# Patient Record
Sex: MALE | Race: WHITE | URBAN - NONMETROPOLITAN AREA
[De-identification: names, ages, dates, MRNs, and addresses within clinical notes are randomized per-mention and may not be internally consistent; named-entity substitution may affect disease eponyms.]

---

## 2018-11-25 ENCOUNTER — APPOINTMENT (OUTPATIENT)
Dept: CT IMAGING | Facility: OTHER | Age: 58
End: 2018-11-25
Attending: EMERGENCY MEDICINE
Payer: COMMERCIAL

## 2018-11-25 ENCOUNTER — HOSPITAL ENCOUNTER (EMERGENCY)
Facility: OTHER | Age: 58
Discharge: HOME | End: 2018-11-25
Attending: EMERGENCY MEDICINE | Admitting: EMERGENCY MEDICINE
Payer: COMMERCIAL

## 2018-11-25 VITALS
HEIGHT: 66 IN | TEMPERATURE: 98.1 F | RESPIRATION RATE: 18 BRPM | SYSTOLIC BLOOD PRESSURE: 119 MMHG | OXYGEN SATURATION: 99 % | DIASTOLIC BLOOD PRESSURE: 71 MMHG

## 2018-11-25 DIAGNOSIS — N20.0 NEPHROLITHIASIS: ICD-10-CM

## 2018-11-25 LAB
ALBUMIN SERPL-MCNC: 4.1 G/DL (ref 3.5–5.7)
ALBUMIN UR-MCNC: 30 MG/DL
ALP SERPL-CCNC: 50 U/L (ref 34–104)
ALT SERPL W P-5'-P-CCNC: 16 U/L (ref 7–52)
ANION GAP SERPL CALCULATED.3IONS-SCNC: 7 MMOL/L (ref 3–14)
APPEARANCE UR: ABNORMAL
AST SERPL W P-5'-P-CCNC: 18 U/L (ref 13–39)
BACTERIA #/AREA URNS HPF: ABNORMAL /HPF
BASOPHILS # BLD AUTO: 0 10E9/L (ref 0–0.2)
BASOPHILS NFR BLD AUTO: 0.3 %
BILIRUB SERPL-MCNC: 0.5 MG/DL (ref 0.3–1)
BILIRUB UR QL STRIP: ABNORMAL
BUN SERPL-MCNC: 16 MG/DL (ref 7–25)
CALCIUM SERPL-MCNC: 9.1 MG/DL (ref 8.6–10.3)
CHLORIDE SERPL-SCNC: 107 MMOL/L (ref 98–107)
CO2 SERPL-SCNC: 27 MMOL/L (ref 21–31)
COLOR UR AUTO: YELLOW
CREAT SERPL-MCNC: 0.99 MG/DL (ref 0.7–1.3)
DIFFERENTIAL METHOD BLD: ABNORMAL
EOSINOPHIL # BLD AUTO: 0.1 10E9/L (ref 0–0.7)
EOSINOPHIL NFR BLD AUTO: 1.3 %
ERYTHROCYTE [DISTWIDTH] IN BLOOD BY AUTOMATED COUNT: 12.6 % (ref 10–15)
GFR SERPL CREATININE-BSD FRML MDRD: 78 ML/MIN/1.7M2
GLUCOSE SERPL-MCNC: 104 MG/DL (ref 70–105)
GLUCOSE UR STRIP-MCNC: NEGATIVE MG/DL
HCT VFR BLD AUTO: 39.8 % (ref 40–53)
HGB BLD-MCNC: 13.5 G/DL (ref 13.3–17.7)
HGB UR QL STRIP: ABNORMAL
IMM GRANULOCYTES # BLD: 0 10E9/L (ref 0–0.4)
IMM GRANULOCYTES NFR BLD: 0.3 %
KETONES UR STRIP-MCNC: NEGATIVE MG/DL
LEUKOCYTE ESTERASE UR QL STRIP: NEGATIVE
LYMPHOCYTES # BLD AUTO: 0.7 10E9/L (ref 0.8–5.3)
LYMPHOCYTES NFR BLD AUTO: 7.5 %
MCH RBC QN AUTO: 31.3 PG (ref 26.5–33)
MCHC RBC AUTO-ENTMCNC: 33.9 G/DL (ref 31.5–36.5)
MCV RBC AUTO: 92 FL (ref 78–100)
MONOCYTES # BLD AUTO: 0.5 10E9/L (ref 0–1.3)
MONOCYTES NFR BLD AUTO: 5.4 %
MUCOUS THREADS #/AREA URNS LPF: PRESENT /LPF
NEUTROPHILS # BLD AUTO: 8.1 10E9/L (ref 1.6–8.3)
NEUTROPHILS NFR BLD AUTO: 85.2 %
NITRATE UR QL: NEGATIVE
PH UR STRIP: 5.5 PH (ref 5–9)
PLATELET # BLD AUTO: 192 10E9/L (ref 150–450)
POTASSIUM SERPL-SCNC: 4.3 MMOL/L (ref 3.5–5.1)
PROT SERPL-MCNC: 6.4 G/DL (ref 6.4–8.9)
RBC # BLD AUTO: 4.32 10E12/L (ref 4.4–5.9)
RBC #/AREA URNS AUTO: ABNORMAL /HPF
SODIUM SERPL-SCNC: 141 MMOL/L (ref 134–144)
SOURCE: ABNORMAL
SP GR UR STRIP: >1.03 (ref 1–1.03)
UROBILINOGEN UR STRIP-ACNC: 0.2 EU/DL (ref 0.2–1)
WBC # BLD AUTO: 9.5 10E9/L (ref 4–11)
WBC #/AREA URNS AUTO: ABNORMAL /HPF

## 2018-11-25 PROCEDURE — 74176 CT ABD & PELVIS W/O CONTRAST: CPT | Mod: TC

## 2018-11-25 PROCEDURE — 36415 COLL VENOUS BLD VENIPUNCTURE: CPT | Performed by: FAMILY MEDICINE

## 2018-11-25 PROCEDURE — 25000132 ZZH RX MED GY IP 250 OP 250 PS 637: Performed by: FAMILY MEDICINE

## 2018-11-25 PROCEDURE — 96360 HYDRATION IV INFUSION INIT: CPT | Performed by: EMERGENCY MEDICINE

## 2018-11-25 PROCEDURE — 96372 THER/PROPH/DIAG INJ SC/IM: CPT | Mod: XU | Performed by: EMERGENCY MEDICINE

## 2018-11-25 PROCEDURE — 99284 EMERGENCY DEPT VISIT MOD MDM: CPT | Mod: Z6 | Performed by: EMERGENCY MEDICINE

## 2018-11-25 PROCEDURE — 80053 COMPREHEN METABOLIC PANEL: CPT | Performed by: FAMILY MEDICINE

## 2018-11-25 PROCEDURE — 25000128 H RX IP 250 OP 636: Performed by: EMERGENCY MEDICINE

## 2018-11-25 PROCEDURE — 25000131 ZZH RX MED GY IP 250 OP 636 PS 637: Performed by: EMERGENCY MEDICINE

## 2018-11-25 PROCEDURE — 25000128 H RX IP 250 OP 636: Performed by: FAMILY MEDICINE

## 2018-11-25 PROCEDURE — 85025 COMPLETE CBC W/AUTO DIFF WBC: CPT | Performed by: FAMILY MEDICINE

## 2018-11-25 PROCEDURE — 99284 EMERGENCY DEPT VISIT MOD MDM: CPT | Mod: 25 | Performed by: EMERGENCY MEDICINE

## 2018-11-25 PROCEDURE — 81001 URINALYSIS AUTO W/SCOPE: CPT | Performed by: EMERGENCY MEDICINE

## 2018-11-25 RX ORDER — TAMSULOSIN HYDROCHLORIDE 0.4 MG/1
0.4 CAPSULE ORAL ONCE
Status: COMPLETED | OUTPATIENT
Start: 2018-11-25 | End: 2018-11-25

## 2018-11-25 RX ORDER — KETOROLAC TROMETHAMINE 30 MG/ML
30 INJECTION, SOLUTION INTRAMUSCULAR; INTRAVENOUS ONCE
Status: COMPLETED | OUTPATIENT
Start: 2018-11-25 | End: 2018-11-25

## 2018-11-25 RX ORDER — TAMSULOSIN HYDROCHLORIDE 0.4 MG/1
0.4 CAPSULE ORAL DAILY
Qty: 10 CAPSULE | Refills: 0 | Status: SHIPPED | OUTPATIENT
Start: 2018-11-25 | End: 2018-12-05

## 2018-11-25 RX ORDER — ONDANSETRON 4 MG/1
4 TABLET, ORALLY DISINTEGRATING ORAL ONCE
Status: COMPLETED | OUTPATIENT
Start: 2018-11-25 | End: 2018-11-25

## 2018-11-25 RX ORDER — ONDANSETRON 4 MG/1
4 TABLET, ORALLY DISINTEGRATING ORAL EVERY 6 HOURS PRN
Qty: 10 TABLET | Refills: 0 | Status: SHIPPED | OUTPATIENT
Start: 2018-11-25 | End: 2018-12-05

## 2018-11-25 RX ADMIN — SODIUM CHLORIDE 1000 ML: 900 INJECTION, SOLUTION INTRAVENOUS at 08:09

## 2018-11-25 RX ADMIN — KETOROLAC TROMETHAMINE 30 MG: 30 INJECTION, SOLUTION INTRAMUSCULAR at 06:40

## 2018-11-25 RX ADMIN — TAMSULOSIN HYDROCHLORIDE 0.4 MG: 0.4 CAPSULE ORAL at 08:45

## 2018-11-25 RX ADMIN — ONDANSETRON 4 MG: 4 TABLET, ORALLY DISINTEGRATING ORAL at 06:42

## 2018-11-25 NOTE — ED PROVIDER NOTES
History     Chief Complaint   Patient presents with     Flank Pain     L, sudden onset     HPI  Marshall Dean is a 58 year old male who woke up at 300 am in his normal state of health. He was helping a friend load fishing gear into his car when he had abrupt onset of left flank pain at 430 am. He is certain that he did not lift anything heavy or move in a way to attribute this to musculoskeletal pain.     The pain is localized to the left flank and radiates to the left side of the abdomen. He did take two Aleve that did not seem to help his pain. He subsequently developed nausea with vomiting. He denies fevers, chills, testicular pain, abdominal pain, chest pain, palpitations, problems with breathing, and depression.    Mr. Dean reports that he drinks very little water. He also reports that he has not had any medical care for at least 25 to 30 years. He reports that he has no known medical problems and that he takes no medications. He is an ex-smoker of cigarettes, but is still smoking e-cigarettes.    Past Medical History:    History reviewed. No pertinent past medical history.    Past Surgical History:    Past Surgical History:   Procedure Laterality Date     APPENDECTOMY       HERNIA REPAIR       TONSILLECTOMY       Family History:    His father had peritoneal cancer. His mother, brother, and sister are healthy without known history of heart attack, stroke, or cancer.    Social History:  Marital Status:  Single [1]  Social History   Substance Use Topics     Smoking status: Current Every Day Smoker     Smokeless tobacco: Never Used      Comment: Smoked 1ppd of cigarretes for 30 years, quit in 2013, and currently smokes e-cigs     Alcohol use Not on file      Medications:      Naproxen Sodium (ALEVE PO)   ondansetron (ZOFRAN ODT) 4 MG ODT tab   tamsulosin (FLOMAX) 0.4 MG capsule     Review of Systems  Per history of present illness    Physical Exam   BP: (!) 148/96  Heart Rate: 84  Temp: 96.5  F (35.8  " C)  Resp: 18  Height: 167.6 cm (5' 6\")  SpO2: 100 %  Physical Exam   Constitutional: He appears well-developed and well-nourished. No distress.   HENT:   Head: Normocephalic and atraumatic.   Mouth/Throat: Oropharynx is clear and moist.   Eyes: Conjunctivae and EOM are normal. Pupils are equal, round, and reactive to light.   Neck: Normal range of motion. Neck supple. No thyromegaly present.   Cardiovascular: Normal rate and regular rhythm.    Pulmonary/Chest: Effort normal and breath sounds normal.   Abdominal: Soft. There is no tenderness.   Musculoskeletal: Normal range of motion.   Lymphadenopathy:     He has no cervical adenopathy.   Neurological: He is alert.   Skin: Skin is warm and dry. He is not diaphoretic.   Psychiatric: He has a normal mood and affect. His behavior is normal. Judgment and thought content normal.       ED Course     Procedures    I discussed the CT exam findings with the reading Radiologist at 800 am. He told me that the 3 mm stone is on the bladder side of the UVJ, but it appears to still be obstructing flow of urine from the ureter. It is difficult for him to discern if inflammation, hematoma formation, or neoplasm is hindering further passage of this stone away from the UVJ.    At 825 am I discussed CT findings with Dr. Romo, at the [facility], which was the hospital I called per patient preference given proximity to his home. Mr. Dean is currently pain free at the time of this conversation. Although Dr. Romo is unable to see the imaging exam at the time of this conversation, he feels that most likely the stone is still at the orifice of the UVJ. Either way, he recommends that Mr. Dean start Flomax and follows up with Urology this next week.    Results for orders placed or performed during the hospital encounter of 11/25/18 (from the past 24 hour(s))   CT Abdomen Pelvis w/o Contrast    Addendum: 11/25/2018      Addendum created by Shahana Ruiz MD on 11/25/2018 " 8:04:29 AM CST     THIS REPORT CONTAINS FINDINGS THAT MAY BE CRITICAL TO PATIENT CARE.   The findings were verbally communicated via telephone conference with Dr. Vasquez   at 8:04 AM CST on 11/25/2018. The findings were acknowledged and understood.      Narrative    Initial report created on 11/25/2018 7:32:21 AM CST     EXAM:    CT Abdomen and Pelvis Without Intravenous Contrast     EXAM DATE/TIME:    11/25/2018 7:03 AM     CLINICAL HISTORY:    58 years old, male; Pain; Abdominal pain; Flank; Left upper quadrant (luq);   Prior surgery; Surgery date: 6+ months; Surgery type: Appendectomy and hernia   repair; Additional info: Concern for nephrolithiasis     TECHNIQUE:    Axial computed tomography images of the abdomen and pelvis without intravenous   contrast.    All CT scans at this facility use at least one of these dose optimization   techniques: automated exposure control; mA and/or kV adjustment per patient   size (includes targeted exams where dose is matched to clinical indication); or   iterative reconstruction.    Coronal and sagittal reformatted images were created and reviewed.     COMPARISON:    No relevant prior studies available.     FINDINGS:    Lower thorax:  Small hiatal hernia.     ABDOMEN:    Liver: Normal. No mass.    Gallbladder and bile ducts: Normal. No calcified stones. No ductal dilation.    Pancreas:  Punctate pancreatic calcifications, which may relate to chronic   pancreatitis.    Spleen: Normal. No splenomegaly.    Adrenals: Normal. No mass.    Kidneys and ureters:  See Bladder Finding.    Stomach and bowel:  Chronic appearing mucosal inflammatory changes in the   descending colon. No bowel obstruction.    PELVIS:    Bladder:  Contracted appearance of the bladder, with nonspecific soft tissue   fullness. 0.3 cm calculus within the left bladder base. Associated moderate   left hydronephrosis.    Reproductive: Unremarkable as visualized.     ABDOMEN and PELVIS:    Intraperitoneal space: No  free air. No significant fluid collection.    Bones/joints:  Mild degenerative changes in the spine.    Soft tissues:  Small bilateral fat-containing hernias.    Vasculature:  Mild atherosclerotic changes.    Lymph nodes: No enlarged lymph nodes.       Impression    IMPRESSION:   Obstructive calculus within the bladder, with associated left hydronephrosis.  Nonspecific soft tissue fullness within the contracted bladder, which may   relate to inflammatory debris, hematoma, or neoplasia.  Consider cystoscopy for   further evaluation.    THIS DOCUMENT HAS BEEN ELECTRONICALLY SIGNED BY DONELL QUEZADA MD   *UA reflex to Microscopic   Result Value Ref Range    Color Urine Yellow     Appearance Urine Cloudy     Glucose Urine Negative NEG^Negative mg/dL    Bilirubin Urine Small (A) NEG^Negative    Ketones Urine Negative NEG^Negative mg/dL    Specific Gravity Urine >1.030 (H) 1.000 - 1.030    Blood Urine Large (A) NEG^Negative    pH Urine 5.5 5.0 - 9.0 pH    Protein Albumin Urine 30 (A) NEG^Negative mg/dL    Urobilinogen Urine 0.2 0.2 - 1.0 EU/dL    Nitrite Urine Negative NEG^Negative    Leukocyte Esterase Urine Negative NEG^Negative    Source Midstream Urine    Urine Microscopic   Result Value Ref Range    WBC Urine 0 - 5 OTO5^0 - 5 /HPF    RBC Urine 10-25 (A) OTO2^O - 2 /HPF    Bacteria Urine Many (A) NEG^Negative /HPF    Mucous Urine Present (A) NEG^Negative /LPF   CBC with platelets differential   Result Value Ref Range    WBC 9.5 4.0 - 11.0 10e9/L    RBC Count 4.32 (L) 4.4 - 5.9 10e12/L    Hemoglobin 13.5 13.3 - 17.7 g/dL    Hematocrit 39.8 (L) 40.0 - 53.0 %    MCV 92 78 - 100 fl    MCH 31.3 26.5 - 33.0 pg    MCHC 33.9 31.5 - 36.5 g/dL    RDW 12.6 10.0 - 15.0 %    Platelet Count 192 150 - 450 10e9/L    Diff Method Automated Method     % Neutrophils 85.2 %    % Lymphocytes 7.5 %    % Monocytes 5.4 %    % Eosinophils 1.3 %    % Basophils 0.3 %    % Immature Granulocytes 0.3 %    Absolute Neutrophil 8.1 1.6 - 8.3 10e9/L     Absolute Lymphocytes 0.7 (L) 0.8 - 5.3 10e9/L    Absolute Monocytes 0.5 0.0 - 1.3 10e9/L    Absolute Eosinophils 0.1 0.0 - 0.7 10e9/L    Absolute Basophils 0.0 0.0 - 0.2 10e9/L    Abs Immature Granulocytes 0.0 0 - 0.4 10e9/L   Comprehensive metabolic panel   Result Value Ref Range    Sodium 141 134 - 144 mmol/L    Potassium 4.3 3.5 - 5.1 mmol/L    Chloride 107 98 - 107 mmol/L    Carbon Dioxide 27 21 - 31 mmol/L    Anion Gap 7 3 - 14 mmol/L    Glucose 104 70 - 105 mg/dL    Urea Nitrogen 16 7 - 25 mg/dL    Creatinine 0.99 0.70 - 1.30 mg/dL    GFR Estimate 78 >60 mL/min/1.7m2    GFR Estimate If Black >90 >60 mL/min/1.7m2    Calcium 9.1 8.6 - 10.3 mg/dL    Bilirubin Total 0.5 0.3 - 1.0 mg/dL    Albumin 4.1 3.5 - 5.7 g/dL    Protein Total 6.4 6.4 - 8.9 g/dL    Alkaline Phosphatase 50 34 - 104 U/L    ALT 16 7 - 52 U/L    AST 18 13 - 39 U/L       Medications   0.9% sodium chloride BOLUS (1,000 mLs Intravenous New Bag 11/25/18 0809)   ketorolac (TORADOL) injection 30 mg (30 mg Intramuscular Given 11/25/18 0640)   ondansetron (ZOFRAN-ODT) ODT tab 4 mg (4 mg Oral Given 11/25/18 0642)   tamsulosin (FLOMAX) capsule 0.4 mg (0.4 mg Oral Given 11/25/18 0845)       Assessments & Plan (with Medical Decision Making)     I have reviewed the nursing notes.    I have reviewed the findings, diagnosis, plan and need for follow up with the patient.    New Prescriptions    ONDANSETRON (ZOFRAN ODT) 4 MG ODT TAB    Take 1 tablet (4 mg) by mouth every 6 hours as needed for nausea or vomiting    TAMSULOSIN (FLOMAX) 0.4 MG CAPSULE    Take 1 capsule (0.4 mg) by mouth daily for 10 doses     Final diagnoses:   Nephrolithiasis     1) Kidney Stone  Per Radiology, a 3 mm stone appears to be on the bladder side of the junction between the ureter that drains urine from the left kidney and the bladder. So the kidney stone may not be in the ureter any more. The stone appears to still be obstructing the flow of urine from the ureter. It is difficult for  Radiology to discern if inflammation, hematoma formation, or a mass is hindering further passage of this stone away from the junction of the ureter and bladder (ureterovesicular junction).     Per my conversation with Urology at the [facility], the stone could still be at the orifice, or opening, of the ureterovesicular junction. Therefore Urology recommends that you take Flomax once daily to help the stone pass and follow up with Urology next week. Call the [facility] Department of Urology at [phone] on Monday 11/26/18, request an appointment for a kidney stone specialist, and notify them that you were in the emergency department. Bring your notes from the emergency department and the CT scan burned on a disk with you.    Take Flomax 0.4mg once daily until the stone has passed. Strain all urine. Once the stone has passed, submit the stone to your doctor or the Urologist to have lab determine what type of stone has passed. Take one Aleve (naproxen 220 mg) with food every 8 hours for the next three days, even if you do not have pain, and thereafter only take if needed for pain.    Drink five pints (80 fluid ounces) of plain water daily until the stone has passed. Thereafter, drink at least four pints (64 fluid ounces) of plain water daily thereafter to prevent formation of another stone in the future. Do not let yourself ever get dehydrated. If active or sweating, even in the cold, you should drink more water so that you have clear yellow urine at all times.     Take Zofran 4mg every 6 hours if needed for nausea    2) Preventative Care  Establish care with a primary care doctor. You are due for several preventative services exams. You should have a colonoscopy to screen for colon cancer. You should have a non-contrast low dose chest CT to screen for lung cancer. You should have your cholesterol checked. Your blood pressure was slightly elevated, although this is likely due to pain, but  this should be rechecked. Your red blood cell count was on the low end of normal, so this and an iron level should be rechecked too.    11/25/2018   [facility] Angel DO  11/25/18 0848

## 2018-11-25 NOTE — ED PROVIDER NOTES
"Marshall Dean  : 1960 Age: 58 year old Sex: male MRN: [id number]    CC: Flank pain    HPI: Marshall Dean is a 58 year old male with no significant past medical history who presents with 2 hours of abrupt onset left-sided colicky flank pain which radiates down through his abdomen.  Along with this he is feeling quite nauseated and a little bit diaphoretic.  He denies any vomiting, no diarrhea, no history of similar pain.  He is not having any cough or chest pain.  No recent fevers    ED Course and MDM:  Flank pain: Overall the flank pain seems most consistent with a likely diagnosis of nephrolithiasis.  I considered gastroenteritis, but the degree of pain would be unusual for this.  It is too high for typical diverticulitis and the abrupt onset is rather unusual for that.  We will obtain a urinalysis and CT stone protocol to evaluate for this.  In addition he did will be given 30 mg of IM Toradol as well as 4 mg of ODT Zofran.  Prior to any results being back as an outpatient to Dr. Vasquez    Final Clinical Impression:  Flank pain    Shon Dixon MD  Internal Medicine and Emergency Medicine  7:06 AM 18      Physical Exam:  BP (!) 148/96  Temp 96.5  F (35.8  C) (Tympanic)  Resp 18  Ht 1.676 m (5' 6\")  SpO2 100%    Gen: Awake, appears uncomfortable  HEENT: AT/NC  CV: RRR, WWP  Pulm: Nonlabored, equal chest rise  Abd: Soft, nontender throughout. L sided flank pain  Ext: Full ROM no edema  Neuro: AOx3, no focal deficit  Psych: Appropriate affect, cognition intact    ROS:  10 point review of systems performed and negative except as noted in HPI.    Past Medical History:  History reviewed. No pertinent past medical history.      Family history:  No history of sudden cardiac death    Social History:   Social History     Social History     Marital status: Single     Spouse name: N/A     Number of children: N/A     Years of education: N/A     Occupational History     Not on file.     Social " History Main Topics     Smoking status: Unknown If Ever Smoked     Smokeless tobacco: Never Used     Alcohol use Not on file     Drug use: Not on file     Sexual activity: Not on file     Other Topics Concern     Not on file     Social History Narrative     No narrative on file         Surgical History:  History reviewed. No pertinent surgical history.              Shon Dixon MD  11/25/18 0711

## 2018-11-25 NOTE — ED AVS SNAPSHOT
[facility]    [address]    [place] [address]    Phone:  [phone]    Fax:  [phone]                                       Marshall Dean   MRN: [id number]    Department:  [facility]   Date of Visit:  11/25/2018           After Visit Summary Signature Page     I have received my discharge instructions, and my questions have been answered. I have discussed any challenges I see with this plan with the nurse or doctor.    ..........................................................................................................................................  Patient/Patient Representative Signature      ..........................................................................................................................................  Patient Representative Print Name and Relationship to Patient    ..................................................               ................................................  Date                                   Time    ..........................................................................................................................................  Reviewed by Signature/Title    ...................................................              ..............................................  Date                                               Time          22EPIC Rev 08/18

## 2018-11-25 NOTE — DISCHARGE INSTRUCTIONS
1) Kidney Stone  Per Radiology, a 3 mm stone appears to be on the bladder side of the junction between the ureter that drains urine from the left kidney and the bladder. So the kidney stone may not be in the ureter any more. The stone appears to still be obstructing the flow of urine from the ureter. It is difficult for Radiology to discern if inflammation, hematoma formation, or a mass is hindering further passage of this stone away from the junction of the ureter and bladder (ureterovesicular junction).     Per my conversation with Urology at the [facility], the stone could still be at the orifice, or opening, of the ureterovesicular junction. Therefore Urology recommends that you take Flomax once daily to help the stone pass and follow up with Urology next week. Call the [facility] Department of Urology at [phone] on Monday 11/26/18, request an appointment for a kidney stone specialist, and notify them that you were in the emergency department. Bring your notes from the emergency department and the CT scan burned on a disk with you.    Take Flomax 0.4mg once daily until the stone has passed. Strain all urine. Once the stone has passed, submit the stone to your doctor or the Urologist to have lab determine what type of stone has passed. Take one Aleve (naproxen 220 mg) with food every 8 hours for the next three days, even if you do not have pain, and thereafter only take if needed for pain.    Drink five pints (80 fluid ounces) of plain water daily until the stone has passed. Thereafter, drink at least four pints (64 fluid ounces) of plain water daily thereafter to prevent formation of another stone in the future. Do not let yourself ever get dehydrated. If active or sweating, even in the cold, you should drink more water so that you have clear yellow urine at all times.     Take Zofran 4mg every 6 hours if needed for nausea    2) Preventative Care  Establish care with a primary care  doctor. You are due for several preventative services exams. You should have a colonoscopy to screen for colon cancer. You should have a non-contrast low dose chest CT to screen for lung cancer. You should have your cholesterol checked. Your blood pressure was slightly elevated, although this is likely due to pain, but this should be rechecked. Your red blood cell count was on the low end of normal, so this and an iron level should be rechecked too.

## 2018-11-25 NOTE — ED AVS SNAPSHOT
` `     [facility]: [phone]                 INTERAGENCY TRANSFER FORM - NOTES (H&P, Discharge Summary, Consults, Procedures, Therapies)   2018                    Hospital Admission Date: 2018  RAMSEY DEAN   : 1960  Sex: Male        Patient PCP Information     Provider PCP Type    Physician No Ref-Primary General      History & Physicals     No notes of this type exist for this encounter.      Discharge Summaries     No notes of this type exist for this encounter.      Consult Notes     No notes of this type exist for this encounter.         Progress Notes - Physician (Notes from 18 through 18)      ED Provider Notes by Miguel Angel Vasquez DO at 2018  6:30 AM     Author:  Miguel Angel Vasquez DO Service:  Family Medicine Author Type:  Osteopath    Filed:  2018  8:48 AM Date of Service:  2018  6:30 AM Creation Time:  2018  7:40 AM    Status:  Signed :  Miguel Angel Vasquez DO (Osteopath)           History[EM1.1]     Chief Complaint   Patient presents with     Flank Pain     L, sudden onset[EM1.2]     HPI  Ramsey Dean is a 58 year old male who woke up at 300 am in his normal state of health. He was helping a friend load fishing gear into his car when he had abrupt onset of left flank pain at 430 am. He is certain that he did not lift anything heavy or move in a way to attribute this to musculoskeletal pain.     The pain is localized to the left flank and radiates to the left side of the abdomen. He did take two Aleve that did not seem to help his pain. He subsequently developed nausea with vomiting. He denies fevers, chills, testicular pain, abdominal pain, chest pain, palpitations, problems with breathing, and depression.    Mr. Dean reports that he drinks very little water. He also reports that he has not had any medical care for at least 25 to 30 years. He reports that he has no known medical problems and that he takes no  "medications. He is an ex-smoker of cigarettes, but is still smoking e-cigarettes.    Past Medical History:[EM1.1]    History reviewed. No pertinent past medical history.[EM1.2]    Past Surgical History:[EM1.1]    Past Surgical History:   Procedure Laterality Date     APPENDECTOMY       HERNIA REPAIR       TONSILLECTOMY[EM1.2]       Family History:    His father had peritoneal cancer. His mother, brother, and sister are healthy without known history of heart attack, stroke, or cancer.    Social History:  Marital Status:  Single [1][EM1.1]  Social History   Substance Use Topics     Smoking status: Current Every Day Smoker     Smokeless tobacco: Never Used      Comment: Smoked 1ppd of cigarretes for 30 years, quit in 2013, and currently smokes e-cigs     Alcohol use Not on file[EM1.2]      Medications:[EM1.1]      Naproxen Sodium (ALEVE PO)   ondansetron (ZOFRAN ODT) 4 MG ODT tab   tamsulosin (FLOMAX) 0.4 MG capsule[EM1.2]     Review of Systems[EM1.1]  Per history of present illness[EM1.3]    Physical Exam[EM1.1]   BP: (!) 148/96  Heart Rate: 84  Temp: 96.5  F (35.8  C)  Resp: 18  Height: 167.6 cm (5' 6\")  SpO2: 100 %[EM1.2]  Physical Exam   Constitutional: He appears[EM1.1] well-developed[EM1.3] and[EM1.1] well-nourished[EM1.3].[EM1.1] No distress[EM1.3].   HENT:   Head:[EM1.1] Normocephalic[EM1.3] and[EM1.1] atraumatic[EM1.3].   Mouth/Throat:[EM1.1] Oropharynx is clear and moist[EM1.3].   Eyes:[EM1.1] Conjunctivae[EM1.3] and[EM1.1] EOM[EM1.3] are normal.[EM1.1] Pupils are equal, round, and reactive to light[EM1.3].   Neck:[EM1.1] Normal range of motion[EM1.3].[EM1.1] Neck supple[EM1.3].[EM1.1] No thyromegaly[EM1.3] present.   Cardiovascular:[EM1.1] Normal rate[EM1.3] and[EM1.1] regular rhythm[EM1.3].    Pulmonary/Chest:[EM1.1] Effort normal[EM1.3] and[EM1.1] breath sounds normal[EM1.3].   Abdominal:[EM1.1] Soft[EM1.3]. There is[EM1.1] no tenderness[EM1.3].   Musculoskeletal:[EM1.1] Normal range of motion[EM1.3]. "   Lymphadenopathy:     He has[EM1.1] no cervical adenopathy[EM1.3].   Neurological: He is[EM1.1] alert[EM1.3].   Skin: Skin is[EM1.1] warm[EM1.3] and[EM1.1] dry[EM1.3]. He is[EM1.1] not diaphoretic[EM1.3].   Psychiatric: He has a[EM1.1] normal mood and affect[EM1.3]. His[EM1.1] behavior is normal[EM1.3].[EM1.1] Judgment[EM1.3] and[EM1.1] thought content[EM1.3] normal.       ED Course     Procedures[EM1.1]    I discussed the CT exam findings with the reading Radiologist at 800 am. He told me that the 3 mm stone is on the bladder side of the UVJ, but it appears to still be obstructing flow of urine from the ureter. It is difficult for him to discern if inflammation, hematoma formation, or neoplasm is hindering further passage of this stone away from the UVJ.[EM1.3]    At 825 am I discussed CT findings with Dr. Romo, at the [facility], which was the hospital I called per patient preference given proximity to his home. Mr. Dean is currently pain free at the time of this conversation. Although Dr. Romo is unable to see the imaging exam at the time of this conversation, he feels that most likely the stone is still at the orifice of the UVJ. Either way, he recommends that Mr. Dean start Flomax and follows up with Urology this next week.[EM1.4]    Results for orders placed or performed during the hospital encounter of 11/25/18 (from the past 24 hour(s))   CT Abdomen Pelvis w/o Contrast    Addendum: 11/25/2018      Addendum created by Shahana Ruiz MD on 11/25/2018 8:04:29 AM CST     THIS REPORT CONTAINS FINDINGS THAT MAY BE CRITICAL TO PATIENT CARE.   The findings were verbally communicated via telephone conference with Dr. Vasquez   at 8:04 AM CST on 11/25/2018. The findings were acknowledged and understood.      Narrative    Initial report created on 11/25/2018 7:32:21 AM CST     EXAM:    CT Abdomen and Pelvis Without Intravenous Contrast     EXAM DATE/TIME:    11/25/2018 7:03 AM     CLINICAL HISTORY:     58 years old, male; Pain; Abdominal pain; Flank; Left upper quadrant (luq);   Prior surgery; Surgery date: 6+ months; Surgery type: Appendectomy and hernia   repair; Additional info: Concern for nephrolithiasis     TECHNIQUE:    Axial computed tomography images of the abdomen and pelvis without intravenous   contrast.    All CT scans at this facility use at least one of these dose optimization   techniques: automated exposure control; mA and/or kV adjustment per patient   size (includes targeted exams where dose is matched to clinical indication); or   iterative reconstruction.    Coronal and sagittal reformatted images were created and reviewed.     COMPARISON:    No relevant prior studies available.     FINDINGS:    Lower thorax:  Small hiatal hernia.     ABDOMEN:    Liver: Normal. No mass.    Gallbladder and bile ducts: Normal. No calcified stones. No ductal dilation.    Pancreas:  Punctate pancreatic calcifications, which may relate to chronic   pancreatitis.    Spleen: Normal. No splenomegaly.    Adrenals: Normal. No mass.    Kidneys and ureters:  See Bladder Finding.    Stomach and bowel:  Chronic appearing mucosal inflammatory changes in the   descending colon. No bowel obstruction.    PELVIS:    Bladder:  Contracted appearance of the bladder, with nonspecific soft tissue   fullness. 0.3 cm calculus within the left bladder base. Associated moderate   left hydronephrosis.    Reproductive: Unremarkable as visualized.     ABDOMEN and PELVIS:    Intraperitoneal space: No free air. No significant fluid collection.    Bones/joints:  Mild degenerative changes in the spine.    Soft tissues:  Small bilateral fat-containing hernias.    Vasculature:  Mild atherosclerotic changes.    Lymph nodes: No enlarged lymph nodes.       Impression    IMPRESSION:   Obstructive calculus within the bladder, with associated left hydronephrosis.  Nonspecific soft tissue fullness within the contracted bladder, which may   relate to  inflammatory debris, hematoma, or neoplasia.  Consider cystoscopy for   further evaluation.    THIS DOCUMENT HAS BEEN ELECTRONICALLY SIGNED BY DONELL QUEZADA MD   *UA reflex to Microscopic   Result Value Ref Range    Color Urine Yellow     Appearance Urine Cloudy     Glucose Urine Negative NEG^Negative mg/dL    Bilirubin Urine Small (A) NEG^Negative    Ketones Urine Negative NEG^Negative mg/dL    Specific Gravity Urine >1.030 (H) 1.000 - 1.030    Blood Urine Large (A) NEG^Negative    pH Urine 5.5 5.0 - 9.0 pH    Protein Albumin Urine 30 (A) NEG^Negative mg/dL    Urobilinogen Urine 0.2 0.2 - 1.0 EU/dL    Nitrite Urine Negative NEG^Negative    Leukocyte Esterase Urine Negative NEG^Negative    Source Midstream Urine    Urine Microscopic   Result Value Ref Range    WBC Urine 0 - 5 OTO5^0 - 5 /HPF    RBC Urine 10-25 (A) OTO2^O - 2 /HPF    Bacteria Urine Many (A) NEG^Negative /HPF    Mucous Urine Present (A) NEG^Negative /LPF   CBC with platelets differential   Result Value Ref Range    WBC 9.5 4.0 - 11.0 10e9/L    RBC Count 4.32 (L) 4.4 - 5.9 10e12/L    Hemoglobin 13.5 13.3 - 17.7 g/dL    Hematocrit 39.8 (L) 40.0 - 53.0 %    MCV 92 78 - 100 fl    MCH 31.3 26.5 - 33.0 pg    MCHC 33.9 31.5 - 36.5 g/dL    RDW 12.6 10.0 - 15.0 %    Platelet Count 192 150 - 450 10e9/L    Diff Method Automated Method     % Neutrophils 85.2 %    % Lymphocytes 7.5 %    % Monocytes 5.4 %    % Eosinophils 1.3 %    % Basophils 0.3 %    % Immature Granulocytes 0.3 %    Absolute Neutrophil 8.1 1.6 - 8.3 10e9/L    Absolute Lymphocytes 0.7 (L) 0.8 - 5.3 10e9/L    Absolute Monocytes 0.5 0.0 - 1.3 10e9/L    Absolute Eosinophils 0.1 0.0 - 0.7 10e9/L    Absolute Basophils 0.0 0.0 - 0.2 10e9/L    Abs Immature Granulocytes 0.0 0 - 0.4 10e9/L   Comprehensive metabolic panel   Result Value Ref Range    Sodium 141 134 - 144 mmol/L    Potassium 4.3 3.5 - 5.1 mmol/L    Chloride 107 98 - 107 mmol/L    Carbon Dioxide 27 21 - 31 mmol/L    Anion Gap 7 3 - 14 mmol/L     Glucose 104 70 - 105 mg/dL    Urea Nitrogen 16 7 - 25 mg/dL    Creatinine 0.99 0.70 - 1.30 mg/dL    GFR Estimate 78 >60 mL/min/1.7m2    GFR Estimate If Black >90 >60 mL/min/1.7m2    Calcium 9.1 8.6 - 10.3 mg/dL    Bilirubin Total 0.5 0.3 - 1.0 mg/dL    Albumin 4.1 3.5 - 5.7 g/dL    Protein Total 6.4 6.4 - 8.9 g/dL    Alkaline Phosphatase 50 34 - 104 U/L    ALT 16 7 - 52 U/L    AST 18 13 - 39 U/L       Medications   0.9% sodium chloride BOLUS (1,000 mLs Intravenous New Bag 11/25/18 0809)   ketorolac (TORADOL) injection 30 mg (30 mg Intramuscular Given 11/25/18 0640)   ondansetron (ZOFRAN-ODT) ODT tab 4 mg (4 mg Oral Given 11/25/18 0642)   tamsulosin (FLOMAX) capsule 0.4 mg (0.4 mg Oral Given 11/25/18 0845)[EM1.2]       Assessments & Plan (with Medical Decision Making)     I have reviewed the nursing notes.    I have reviewed the findings, diagnosis, plan and need for follow up with the patient.[EM1.1]    New Prescriptions    ONDANSETRON (ZOFRAN ODT) 4 MG ODT TAB    Take 1 tablet (4 mg) by mouth every 6 hours as needed for nausea or vomiting    TAMSULOSIN (FLOMAX) 0.4 MG CAPSULE    Take 1 capsule (0.4 mg) by mouth daily for 10 doses     Final diagnoses:   Nephrolithiasis[EM1.2]     1) Kidney Stone  Per Radiology, a[EM1.4] 3 mm stone[EM1.3] appears to be[EM1.4] on the bladder side of the[EM1.3] junction between the ureter that drains urine from the left kidney and the bladder. So the kidney stone[EM1.5] may[EM1.4] not[EM1.5] be[EM1.4] in the ureter any more.[EM1.5] The stone[EM1.4] appears to still be obstructing[EM1.3] the[EM1.5] flow of urine from the ureter. It is difficult for[EM1.3] Radiology[EM1.5] t[EM1.4]o discern if inflammation, hematoma formation, or[EM1.3] a mass[EM1.5] is hindering further passage of this stone away from the[EM1.3] junction of the ureter and bladder (ureterovesicular junction).[EM1.5]     Per my conversation with Urology at the [facility], the stone could still be at the  orifice, or opening, of the[EM1.4] ureterovesicular junction[EM1.5]. Therefore Urology recommends that you take Flomax once daily to help the stone pass and follow up with Urology next week. Call the [facility] Department of Urology at [phone] on Monday 11/26/18, request an appointment for a kidney stone specialist, and notify them that you were in the emergency department. Bring your notes from the emergency department and the CT scan burned on a disk with you.    Take Flomax 0.4mg once daily until the stone has passed. Strain all urine. Once the stone has passed, submit the stone to your doctor or the Urologist to have lab determine what type of stone has passed. Take one Aleve (naproxen 220 mg) with food every 8 hours for the next three days, even if you do not have pain, and thereafter only take if needed for pain.    Drink five pints (80 fluid ounces) of plain water daily until the stone has passed. Thereafter, drink at least four pints (64 fluid ounces) of plain water daily thereafter to prevent formation of another stone in the future. Do not let yourself ever get dehydrated. If active or sweating, even in the cold, you should drink more water so that you have clear yellow urine at all times.[EM1.4]     Take Zofran 4mg every 6 hours if needed for nausea[EM1.2]    2) Preventative Care[EM1.4]  Establish care with a primary care doctor. You are due for several preventative services exams. You should have a colonoscopy to screen for colon cancer. You should have a non-contrast low dose chest CT to screen for lung cancer. You should have your cholesterol checked. Your blood pressure was slightly elevated, although this is likely due to pain, but this should be rechecked.[EM1.3] Your red blood cell count was on the low end of normal, so this and an iron level should be rechecked too.[EM1.2]    11/25/2018   [facility][EM1.1]     Miguel Angel Vasquez, DO  11/25/18  0848  [EM1.6]     Revision History        User Key Date/Time User Provider Type Action    > EM1.6 2018  8:48 AM Munns, Miguel Angel Carrasco, DO Osteopath Sign     EM1.2 2018  8:47 AM Munns, Miguel Angel Danilo, DO Osteopath      EM1.4 2018  8:25 AM Munns, Miguel Angel Danilo, DO Osteopath      EM1.5 2018  8:08 AM Munns, Hazard Danilo, DO Osteopath      EM1.3 2018  7:48 AM Munns, Miguel Angel Danilo, DO Osteopath      EM1.1 2018  7:40 AM Munns, Miguel Angel Danilo, DO Osteopath             ED Provider Notes by Shon Dixon MD at 2018  6:30 AM     Author:  Shon Dixon MD Service:  Emergency Medicine Author Type:  Physician    Filed:  2018  7:11 AM Date of Service:  2018  6:30 AM Creation Time:  2018  7:06 AM    Status:  Signed :  Shon Dixon MD (Physician)         Marshall Dean  : 1960 Age: 58 year old Sex: male MRN: [id number]    CC: Flank pain    HPI: Marshall Dean is a 58 year old male with no significant past medical history who presents with 2 hours of abrupt onset left-sided colicky flank pain which radiates down through his abdomen.  Along with this he is feeling quite nauseated and a little bit diaphoretic.  He denies any vomiting, no diarrhea, no history of similar pain.  He is not having any cough or chest pain.  No recent fevers    ED Course and MDM:  Flank pain: Overall the flank pain seems most consistent with a likely diagnosis of nephrolithiasis.  I considered gastroenteritis, but the degree of pain would be unusual for this.  It is too high for typical diverticulitis and the abrupt onset is rather unusual for that.  We will obtain a urinalysis and CT stone protocol to evaluate for this.  In addition he did will be given 30 mg of IM Toradol as well as 4 mg of ODT Zofran.  Prior to any results being back as an outpatient to Dr. Vasquez    Final Clinical Impression:  Flank pain    Shon Dixon MD  Internal Medicine and  "Emergency Medicine  7:06 AM 11/25/18      Physical Exam:  BP (!) 148/96  Temp 96.5  F (35.8  C) (Tympanic)  Resp 18  Ht 1.676 m (5' 6\")  SpO2 100%    Gen: Awake, appears uncomfortable  HEENT: AT/NC  CV: RRR, WWP  Pulm: Nonlabored, equal chest rise  Abd: Soft, nontender throughout. L sided flank pain  Ext: Full ROM no edema  Neuro: AOx3, no focal deficit  Psych: Appropriate affect, cognition intact    ROS:  10 point review of systems performed and negative except as noted in HPI.    Past Medical History:  History reviewed. No pertinent past medical history.      Family history:  No history of sudden cardiac death    Social History:   Social History     Social History     Marital status: Single     Spouse name: N/A     Number of children: N/A     Years of education: N/A     Occupational History     Not on file.     Social History Main Topics     Smoking status: Unknown If Ever Smoked     Smokeless tobacco: Never Used     Alcohol use Not on file     Drug use: Not on file     Sexual activity: Not on file     Other Topics Concern     Not on file     Social History Narrative     No narrative on file         Surgical History:  History reviewed. No pertinent surgical history.[DP1.1]              Shon Dixon MD  11/25/18 0711  [DP1.2]     Revision History        User Key Date/Time User Provider Type Action    > DP1.2 11/25/2018  7:11 AM Shon Dixon MD Physician Sign     DP1.1 11/25/2018  7:06 AM Shon Dixon MD Physician             ED Notes by Priscilla Sofia RN at 11/25/2018  6:54 AM     Author:  Priscilla Sofia RN Service:  (none) Author Type:  Registered Nurse    Filed:  11/25/2018  6:54 AM Date of Service:  11/25/2018  6:54 AM Creation Time:  11/25/2018  6:54 AM    Status:  Signed :  Priscilla Sofia RN (Registered Nurse)         Report from Emily MILLS[MF1.1]     Revision History        User Key Date/Time User Provider Type Action    > MF1.1 11/25/2018  6:54 AM " Priscilla Sofia RN Registered Nurse Sign            ED Notes by Emily Allen RN at 11/25/2018  6:45 AM     Author:  Tiffany, Emily J., RN Service:  Nursing Author Type:  Registered Nurse    Filed:  11/25/2018  6:47 AM Date of Service:  11/25/2018  6:45 AM Creation Time:  11/25/2018  6:47 AM    Status:  Signed :  Emily Allen RN (Registered Nurse)         Patient instructed to void and urinal provided.[BM1.1]      Revision History        User Key Date/Time User Provider Type Action    > BM1.1 11/25/2018  6:47 AM Emily Allen RN Registered Nurse Sign                  Procedure Notes     No notes of this type exist for this encounter.      Progress Notes - Therapies (Notes from 11/22/18 through 11/25/18)     No notes of this type exist for this encounter.

## 2018-11-25 NOTE — ED AVS SNAPSHOT
"    [facility]: [phone]                                              INTERAGENCY TRANSFER FORM - LAB / IMAGING / EKG / EMG RESULTS   2018                    Hospital Admission Date: 2018  RAMSEY SALAZAR   : 1960  Sex: Male        Attending Provider: Miguel Angel Vasquez DO     Allergies:  No Known Allergies    Infection:  None   Service:  EMERGENCY ME    Ht:  1.676 m (5' 6\")   Wt:  --   Admission Wt:  --    BMI:  --   BSA:  --            Patient PCP Information     Provider PCP Type    Physician No Ref-Primary General         Lab Results - 3 Days      Comprehensive metabolic panel [[id number]]  Resulted: 18 0838, Result status: Final result    Ordering provider: Miguel Angel Vasquez DO  18 0740 Resulting lab: [facility]    Specimen Information    Type Source Collected On   Blood  18 0755          Components       Value Reference Range Flag Lab   Sodium 141 134 - 144 mmol/L  [facility]   Potassium 4.3 3.5 - 5.1 mmol/L  [facility]   Chloride 107 98 - 107 mmol/L  [facility]   Carbon Dioxide 27 21 - 31 mmol/L  [facility]   Anion Gap 7 3 - 14 mmol/L  [facility]   Glucose 104 70 - 105 mg/dL  [facility]   Urea Nitrogen 16 7 - 25 mg/dL  [facility]   Creatinine 0.99 0.70 - 1.30 mg/dL  [facility]   GFR Estimate 78 >60 mL/min/1.7m2  [facility]   GFR Estimate If Black >90 >60 mL/min/1.7m2  [facility]   Calcium 9.1 8.6 - 10.3 mg/dL  [facility]   Bilirubin Total 0.5 0.3 - 1.0 mg/dL  [facility]   Albumin 4.1 3.5 - 5.7 g/dL  [facility]   Protein Total 6.4 6.4 - 8.9 g/dL  [facility]   Alkaline Phosphatase 50 34 - 104 U/L  [facility]   ALT 16 7 - 52 U/L  [facility]   AST 18 13 - 39 U/L  [facility]            CBC with platelets differential [[id number]] (Abnormal)  Resulted: 1813, Result status: Final result    Ordering provider: Miguel Angel Vasquez DO  18 0740 Resulting lab: [facility]    Specimen Information    Type " Source Collected On   Blood  11/25/18 0755          Components       Value Reference Range Flag Lab   WBC 9.5 4.0 - 11.0 10e9/L  [facility]   RBC Count 4.32 4.4 - 5.9 10e12/L L [facility]   Hemoglobin 13.5 13.3 - 17.7 g/dL  [facility]   Hematocrit 39.8 40.0 - 53.0 % L [facility]   MCV 92 78 - 100 fl  [facility]   MCH 31.3 26.5 - 33.0 pg  [facility]   MCHC 33.9 31.5 - 36.5 g/dL  [facility]   RDW 12.6 10.0 - 15.0 %  [facility]   Platelet Count 192 150 - 450 10e9/L  [facility]   Diff Method Automated Method   [facility]   % Neutrophils 85.2 %  [facility]   % Lymphocytes 7.5 %  [facility]   % Monocytes 5.4 %  [facility]   % Eosinophils 1.3 %  [facility]   % Basophils 0.3 %  [facility]   % Immature Granulocytes 0.3 %  [facility]   Absolute Neutrophil 8.1 1.6 - 8.3 10e9/L  [facility]   Absolute Lymphocytes 0.7 0.8 - 5.3 10e9/L L [facility]   Absolute Monocytes 0.5 0.0 - 1.3 10e9/L  [facility]   Absolute Eosinophils 0.1 0.0 - 0.7 10e9/L  [facility]   Absolute Basophils 0.0 0.0 - 0.2 10e9/L  [facility]   Abs Immature Granulocytes 0.0 0 - 0.4 10e9/L  [facility]            Urine Microscopic [[id number]] (Abnormal)  Resulted: 11/25/18 0744, Result status: Final result    Ordering provider: Shon Dixon MD  11/25/18 0722 Resulting lab: [facility]    Specimen Information    Type Source Collected On     11/25/18 0722          Components       Value Reference Range Flag Lab   WBC Urine 0 - 5 OTO5^0 - 5 /HPF  [facility]   RBC Urine 10-25 OTO2^O - 2 /HPF A [facility]   Bacteria Urine Many NEG^Negative /HPF A [facility]   Mucous Urine Present NEG^Negative /LPF A [facility]            *UA reflex to Microscopic [[id number]] (Abnormal)  Resulted: 11/25/18 0743, Result status: Final result    Ordering provider: Shon Dixon MD  11/25/18 0639 Resulting lab: [facility]    Specimen Information    Type Source Collected On   Midstream Urine Urine clean catch 11/25/18 0301           Components       Value Reference Range Flag Lab   Color Urine Yellow   [facility]   Appearance Urine Cloudy   [facility]   Glucose Urine Negative NEG^Negative mg/dL  [facility]   Bilirubin Urine Small NEG^Negative A [facility]   Comment:         This is an unconfirmed screening test result. A positive result may be false.   Ketones Urine Negative NEG^Negative mg/dL  [facility]   Specific Gravity Urine >1.030 1.000 - 1.030 H [facility]   Blood Urine Large NEG^Negative A [facility]   pH Urine 5.5 5.0 - 9.0 pH  [facility]   Protein Albumin Urine 30 NEG^Negative mg/dL A [facility]   Urobilinogen Urine 0.2 0.2 - 1.0 EU/dL  [facility]   Nitrite Urine Negative NEG^Negative  [facility]   Leukocyte Esterase Urine Negative NEG^Negative  [facility]   Source Midstream Urine   [facility]            Testing Performed By     Lab - Abbreviation Name Director Address Valid Date Range    1743 - [facility] Unknown [address]  [facility] [address] 08/07/15 0948 - Present            Unresulted Labs     None         Imaging Results - 3 Days      CT Abdomen Pelvis w/o Contrast [[id number]]  Resulted: 11/25/18 0804, Result status: Edited Result - FINAL    Ordering provider: Shon Dixon MD  11/25/18 0639 Resulted by: SHAHANA QUEZADA    Performed: 11/25/18 0703 - 11/25/18 0717 Resulting lab: RADIOLOGY RESULTS    Addenda:    Addendum created by Shahana Quezada MD on 11/25/2018 8:04:29 AM CST       THIS REPORT CONTAINS FINDINGS THAT MAY BE CRITICAL TO PATIENT CARE.    The findings were verbally communicated via telephone conference with Dr. Vasquez    at 8:04 AM CST on 11/25/2018. The findings were acknowledged and    understood.  Signed:  11/25/18 0804 by SHAHANA QUEZADA    Narrative:       Initial report created on 11/25/2018 7:32:21 AM CST     EXAM:    CT Abdomen and Pelvis Without Intravenous Contrast     EXAM DATE/TIME:    11/25/2018 7:03 AM     CLINICAL HISTORY:    58 years old, male;  Pain; Abdominal pain; Flank; Left upper quadrant (luq);   Prior surgery; Surgery date: 6+ months; Surgery type: Appendectomy and hernia   repair; Additional info: Concern for nephrolithiasis     TECHNIQUE:    Axial computed tomography images of the abdomen and pelvis without intravenous   contrast.    All CT scans at this facility use at least one of these dose optimization   techniques: automated exposure control; mA and/or kV adjustment per patient   size (includes targeted exams where dose is matched to clinical indication); or   iterative reconstruction.    Coronal and sagittal reformatted images were created and reviewed.     COMPARISON:    No relevant prior studies available.     FINDINGS:    Lower thorax:  Small hiatal hernia.     ABDOMEN:    Liver: Normal. No mass.    Gallbladder and bile ducts: Normal. No calcified stones. No ductal dilation.    Pancreas:  Punctate pancreatic calcifications, which may relate to chronic   pancreatitis.    Spleen: Normal. No splenomegaly.    Adrenals: Normal. No mass.    Kidneys and ureters:  See Bladder Finding.    Stomach and bowel:  Chronic appearing mucosal inflammatory changes in the   descending colon. No bowel obstruction.    PELVIS:    Bladder:  Contracted appearance of the bladder, with nonspecific soft tissue   fullness. 0.3 cm calculus within the left bladder base. Associated moderate   left hydronephrosis.    Reproductive: Unremarkable as visualized.     ABDOMEN and PELVIS:    Intraperitoneal space: No free air. No significant fluid collection.    Bones/joints:  Mild degenerative changes in the spine.    Soft tissues:  Small bilateral fat-containing hernias.    Vasculature:  Mild atherosclerotic changes.    Lymph nodes: No enlarged lymph nodes.       Impression:       IMPRESSION:   Obstructive calculus within the bladder, with associated left hydronephrosis.  Nonspecific soft tissue fullness within the contracted bladder, which may   relate to inflammatory debris,  hematoma, or neoplasia.  Consider cystoscopy for   further evaluation.    THIS DOCUMENT HAS BEEN ELECTRONICALLY SIGNED BY DONELL QUEZADA MD    Specimen Information    Type Source Collected On     11/25/18 0703            Testing Performed By     Lab - Abbreviation Name Director Address Valid Date Range    104 - [facility] RADIOLOGY RESULTS Unknown Unknown 02/16/05 1553 - Present            Encounter-Level Documents:     There are no encounter-level documents.      Order-Level Documents:     There are no order-level documents.

## 2018-11-25 NOTE — ED AVS SNAPSHOT
[facility]    [address]    [place] [address]    Phone:  [phone]    Fax:  [phone]                                       Marshall Dean   MRN: [id number]    Department:  [facility]   Date of Visit:  11/25/2018           Patient Information     Date Of Birth          1960        Your diagnoses for this visit were:     Nephrolithiasis        You were seen by Shon Dixon MD and Miguel Angel Vasquez DO.        Discharge Instructions       1) Kidney Stone  Per Radiology, a 3 mm stone appears to be on the bladder side of the junction between the ureter that drains urine from the left kidney and the bladder. So the kidney stone may not be in the ureter any more. The stone appears to still be obstructing the flow of urine from the ureter. It is difficult for Radiology to discern if inflammation, hematoma formation, or a mass is hindering further passage of this stone away from the junction of the ureter and bladder (ureterovesicular junction).     Per my conversation with Urology at the [facility], the stone could still be at the orifice, or opening, of the ureterovesicular junction. Therefore Urology recommends that you take Flomax once daily to help the stone pass and follow up with Urology next week. Call the [facility] Department of Urology at [phone] on Monday 11/26/18, request an appointment for a kidney stone specialist, and notify them that you were in the emergency department. Bring your notes from the emergency department and the CT scan burned on a disk with you.    Take Flomax 0.4mg once daily until the stone has passed. Strain all urine. Once the stone has passed, submit the stone to your doctor or the Urologist to have lab determine what type of stone has passed. Take one Aleve (naproxen 220 mg) with food every 8 hours for the next three days, even if you do not have pain, and thereafter only  take if needed for pain.    Drink five pints (80 fluid ounces) of plain water daily until the stone has passed. Thereafter, drink at least four pints (64 fluid ounces) of plain water daily thereafter to prevent formation of another stone in the future. Do not let yourself ever get dehydrated. If active or sweating, even in the cold, you should drink more water so that you have clear yellow urine at all times.     Take Zofran 4mg every 6 hours if needed for nausea    2) Preventative Care  Establish care with a primary care doctor. You are due for several preventative services exams. You should have a colonoscopy to screen for colon cancer. You should have a non-contrast low dose chest CT to screen for lung cancer. You should have your cholesterol checked. Your blood pressure was slightly elevated, although this is likely due to pain, but this should be rechecked. Your red blood cell count was on the low end of normal, so this and an iron level should be rechecked too.    24 Hour Appointment Hotline     To schedule an appointment at [facility], please call [phone]. If you don't have a family doctor or clinic, we will help you find one. [facility] clinics are conveniently located to serve the needs of you and your family.           Review of your medicines      START taking        Dose / Directions Last dose taken    ondansetron 4 MG ODT tab   Commonly known as:  ZOFRAN ODT   Dose:  4 mg   Quantity:  10 tablet        Take 1 tablet (4 mg) by mouth every 6 hours as needed for nausea or vomiting   Refills:  0        tamsulosin 0.4 MG capsule   Commonly known as:  FLOMAX   Dose:  0.4 mg   Quantity:  10 capsule        Take 1 capsule (0.4 mg) by mouth daily for 10 doses   Refills:  0          Our records show that you are taking the medicines listed below. If these are incorrect, please call your family doctor or clinic.        Dose / Directions Last dose taken    ALEVE PO        Refills:  0                Prescriptions  "were sent or printed at these locations (2 Prescriptions)                   Other Prescriptions                Printed at Department/Unit printer (2 of 2)         ondansetron (ZOFRAN ODT) 4 MG ODT tab               tamsulosin (FLOMAX) 0.4 MG capsule                Procedures and tests performed during your visit     *UA reflex to Microscopic    CBC with platelets differential    CT Abdomen Pelvis w/o Contrast    Comprehensive metabolic panel    Peripheral IV catheter    Urine Microscopic      Orders Needing Specimen Collection     None      Pending Results     No orders found from 11/23/2018 to 11/26/2018.            Pending Culture Results     No orders found from 11/23/2018 to 11/26/2018.            Pending Results Instructions     If you had any lab results that were not finalized at the time of your Discharge, you can call the ED Lab Result RN at [phone]. You will be contacted by this team for any positive Lab results or changes in treatment. The nurses are available 7 days a week from 10A to 6:30P.  You can leave a message 24 hours per day and they will return your call.        Thank you for choosing [facility]       Thank you for choosing [facility] for your care. Our goal is always to provide you with excellent care. Hearing back from our patients is one way we can continue to improve our services. Please take a few minutes to complete the written survey that you may receive in the mail after you visit with us. Thank you!        [employer] Information     [employer] lets you send messages to your doctor, view your test results, renew your prescriptions, schedule appointments and more. To sign up, go to [email or web address] . Click on \"Log in\" on the left side of the screen, which will take you to the Welcome page. Then click on \"Sign up Now\" on the right side of the page.     You will be asked to enter the access code listed below, as well as some personal information. Please follow the directions to create " your username and password.     Your access code is: [email or web address]  Expires: 2019  8:49 AM     Your access code will  in 90 days. If you need help or a new code, please call your [facility] clinic or [phone].        Care EveryWhere ID     This is your Care EveryWhere ID. This could be used by other organizations to access your [facility] medical records  [id number]        Equal Access to Services     [facility] : Hadii jason gomez hadasho Soomaali, waaxda luqadaha, qaybta kaalmada adeegyada, carmine kim . So [facility] [phone].    ATENCIÓN: Si habla español, tiene a hernandez disposición servicios gratuitos de asistencia lingüística. Llame al [phone].    We comply with applicable federal civil rights laws and [place] laws. We do not discriminate on the basis of race, color, national origin, age, disability, sex, sexual orientation, or gender identity.            After Visit Summary       This is your record. Keep this with you and show to your community pharmacist(s) and doctor(s) at your next visit.

## 2018-11-26 ENCOUNTER — TELEPHONE (OUTPATIENT)
Dept: UROLOGY | Facility: CLINIC | Age: 58
End: 2018-11-26

## 2018-11-26 NOTE — TELEPHONE ENCOUNTER
----- Message from Rik Cardona MD sent at 11/25/2018  8:41 AM CST -----  Please schedule this patient for consultation appointment for Juliane Ospina, or Tiera, for history of kidney stone and question of irregular indeterminate bladder findings on CT.    Thanks,  Rik   Urology G2

## 2018-11-26 NOTE — TELEPHONE ENCOUNTER
Patient called and message left to confirm appointment with nilesh on Friday dec 7th at 11:30am Loreto Dunham LPN Staff Nurse

## 2018-11-28 ENCOUNTER — PRE VISIT (OUTPATIENT)
Dept: UROLOGY | Facility: CLINIC | Age: 58
End: 2018-11-28

## 2018-11-28 NOTE — TELEPHONE ENCOUNTER
MEDICAL RECORDS REQUEST   [facility] Clinic  [address] [place] [address]  PHONE: [phone]  Fax: [phone]        FUTURE VISIT INFORMATION                                                   Marshall Dean, : 1960 scheduled for future visit at [facility]    APPOINTMENT INFORMATION:    Date: 2018    Provider:  Janis Mott    Reason for Visit/Diagnosis: Kidney Stones    REFERRAL INFORMATION:    Referring provider:  Shon Najera    Specialty: MD    Referring providers clinic:  [facility] contact number: [phone];     RECORDS REQUESTED FOR VISIT                                                     NOTES  STATUS/DETAILS   OFFICE NOTE from referring provider  yes   OFFICE NOTE from other specialist  no   DISCHARGE SUMMARY from hospital  no   DISCHARGE REPORT from the ER  no   OPERATIVE REPORT  no   MEDICATION LIST  yes   KIDNEY STONE     CT STONE  yes   IMAGING (IMAGES & REPORT)  no   KUB  no       PRE-VISIT CHECKLIST      Record collection complete Yes   Appointment appropriately scheduled           (right time/right provider) Yes   MyChart activation Yes   Questionnaire complete If no, please explain in process     Completed by: Michelle Magallon

## 2018-11-29 ENCOUNTER — PATIENT OUTREACH (OUTPATIENT)
Dept: CARE COORDINATION | Facility: CLINIC | Age: 58
End: 2018-11-29

## 2018-12-07 ENCOUNTER — OFFICE VISIT (OUTPATIENT)
Dept: UROLOGY | Facility: CLINIC | Age: 58
End: 2018-12-07
Payer: COMMERCIAL

## 2018-12-07 VITALS
WEIGHT: 161.6 LBS | DIASTOLIC BLOOD PRESSURE: 73 MMHG | SYSTOLIC BLOOD PRESSURE: 116 MMHG | BODY MASS INDEX: 25.97 KG/M2 | HEIGHT: 66 IN | HEART RATE: 58 BPM

## 2018-12-07 DIAGNOSIS — N20.1 CALCULUS OF URETER: ICD-10-CM

## 2018-12-07 DIAGNOSIS — N20.1 CALCULUS OF URETER: Primary | ICD-10-CM

## 2018-12-07 LAB — PSA SERPL-MCNC: 3.34 UG/L (ref 0–4)

## 2018-12-07 ASSESSMENT — PAIN SCALES - GENERAL: PAINLEVEL: NO PAIN (0)

## 2018-12-07 ASSESSMENT — ENCOUNTER SYMPTOMS
ARTHRALGIAS: 1
MYALGIAS: 0
DIFFICULTY URINATING: 1
MUSCLE CRAMPS: 0
STIFFNESS: 1
NECK PAIN: 0
JOINT SWELLING: 0
DYSURIA: 0
FLANK PAIN: 1
BACK PAIN: 0
HEMATURIA: 0
MUSCLE WEAKNESS: 0

## 2018-12-07 NOTE — PATIENT INSTRUCTIONS
Please make a appointment for for a cystoscopy  Please do [employer] and make a appointment with    You have been referred to the [facility] Stone [facility]. This means that you will set up an appointment at the Urology Clinic to see a Kidney doctor (Nephrologist) and possibly a dietitian. The Nephrologist will work with you to understand what is causing kidney stones and learn ways to prevent future recurrences. The dietitian can also help you understand how your diet affects this condition. This type of visit is important because there are lots of ways to prevent kidney stones just by changing your medication regimen or tweaking your diet. We may ask you to complete something called a [employer] 24 hour urine collection. A box will be sent to your home from the [employer] company. This box will contain detailed instructions on how to collect your urine for 24 hours and send a sample back to the company. The sample should be sent back at least two weeks before your appointment with the Stone Clinic to ensure we get results before the visit. Please be sure to write down everything you eat and drink for 48 hours, both the day before the urine collection as well as the day of the urine collection. Bring this information with you to the appointment. We may also request blood labs or scans. Please be sure to complete these before your visit as well. All of these tests will help tailor the visit to suit your needs, and make the appointment much more beneficial to you. If you have any questions, or you don't receive the urine collection kit with enough time to complete it before your visit, please call our clinic and we will be happy to help you.       It was a pleasure meeting with you today.  Thank you for allowing me and my team the privilege of caring for you today.  YOU are the reason we are here, and I truly hope we provided you with the excellent service you deserve.  Please let us know if there is anything else  we can do for you so that we can be sure you are leaving completely satisfied with your care experience.

## 2018-12-07 NOTE — PROGRESS NOTES
"It was my pleasure to see Marshall Dean a 58 year old year old male today. Patient was seen in consultation from for ureteral calculus.    HPI:   Patient with distal left ureteral calculus noted on CT scan on 11/25/2018.     Passed stone one week ago Wednesday.     Noted to have soft tissue changes in the bladder on imaging.     We discussed stone passage rates, stone risk assessment, bladder findings on imaging, cystoscopy.     No past medical history on file.    Past Surgical History:   Procedure Laterality Date     APPENDECTOMY       HERNIA REPAIR       TONSILLECTOMY         No family history on file.    Current Outpatient Prescriptions   Medication Sig Dispense Refill     Naproxen Sodium (ALEVE PO)          ALLERGIES: Review of patient's allergies indicates no known allergies.      REVIEW OF SYSTEMS:  Skin: negative  Eyes: negative  Ears/Nose/Throat: negative  Respiratory: No shortness of breath, dyspnea on exertion, cough, or hemoptysis  Cardiovascular: negative  Gastrointestinal: negative  Genitourinary: as above  Musculoskeletal: negative  Neurologic: negative  Psychiatric: negative  Hematologic/Lymphatic/Immunologic: negative  Endocrine: negative      GENERAL PHYSICAL EXAM:   Vitals: /73  Pulse 58  Ht 1.676 m (5' 6\")  Wt 73.3 kg (161 lb 9.6 oz)  BMI 26.08 kg/m2  Body mass index is 26.08 kg/(m^2).  Constitutional: healthy, alert and no distress  Head: Normocephalic  Neck: Neck supple  Cardiovascular: negative  Respiratory: negative  Gastrointestinal: Abdomen soft, non-tender  Musculoskeletal: extremities normal  Skin: no suspicious lesions or rashes  Neurologic: Gait normal  Psychiatric: affect normal/bright and mentation appears normal.       EXAM:   Left Flank: negative  Right Flank: negative  Inguinal Area: normal           RADIOLOGY: The following tests were reviewed: Need to complete the following Radiology exams prior to the office appointment:  LABS: The last test results for Needs to " complete the necessary tests prior to appointment: were reviewed.     ASSESSMENT: 57 y/o M with recently passed ureteral calculus and soft tissues findings in the bladder on CT scan     PLAN:   24 hr urine x2   Refer to nephrology stone clinic   Cystoscopy next available     I spent over 20 minutes with the patient.  Over half this time was spent on counseling for ureteral calculus and cystoscopy.    Answers for HPI/ROS submitted by the patient on 12/7/2018   General Symptoms: No  Skin Symptoms: No  HENT Symptoms: No  EYE SYMPTOMS: No  HEART SYMPTOMS: No  LUNG SYMPTOMS: No  INTESTINAL SYMPTOMS: No  URINARY SYMPTOMS: Yes  REPRODUCTIVE SYMPTOMS: Yes  SKELETAL SYMPTOMS: Yes  BLOOD SYMPTOMS: No  NERVOUS SYSTEM SYMPTOMS: No  MENTAL HEALTH SYMPTOMS: No  Trouble holding urine or incontinence: Yes  Pain or burning: No  Trouble starting or stopping: Yes  Increased frequency of urination: Yes  Blood in urine: No  Decreased frequency of urination: No  Frequent nighttime urination: No  Flank pain: Yes  Difficulty emptying bladder: Yes  Back pain: No  Muscle aches: No  Neck pain: No  Swollen joints: No  Joint pain: Yes  Bone pain: No  Muscle cramps: No  Muscle weakness: No  Joint stiffness: Yes  Bone fracture: No  Scrotal pain or swelling: No  Erectile dysfunction: No  Penile discharge: No  Genital ulcers: No  Reduced libido: No

## 2018-12-07 NOTE — MR AVS SNAPSHOT
After Visit Summary   12/7/2018    Marshall Dean    MRN: [id number]           Patient Information     Date Of Birth          1960        Visit Information        Provider Department      12/7/2018 11:30 AM Janis Mott MD [facility] Urology and [facility]rologic [facility]        Today's Diagnoses     Calculus of ureter    -  1      Care Instructions    Please make a appointment for for a cystoscopy  Please do [employer] and make a appointment with    You have been referred to the [facility]. This means that you will set up an appointment at the Urology Clinic to see a Kidney doctor (Nephrologist) and possibly a dietitian. The Nephrologist will work with you to understand what is causing kidney stones and learn ways to prevent future recurrences. The dietitian can also help you understand how your diet affects this condition. This type of visit is important because there are lots of ways to prevent kidney stones just by changing your medication regimen or tweaking your diet. We may ask you to complete something called a [place] 24 hour urine collection. A box will be sent to your home from the [employer] company. This box will contain detailed instructions on how to collect your urine for 24 hours and send a sample back to the company. The sample should be sent back at least two weeks before your appointment with the Stone Clinic to ensure we get results before the visit. Please be sure to write down everything you eat and drink for 48 hours, both the day before the urine collection as well as the day of the urine collection. Bring this information with you to the appointment. We may also request blood labs or scans. Please be sure to complete these before your visit as well. All of these tests will help tailor the visit to suit your needs, and make the appointment much more beneficial to you. If you have any questions, or you don't receive the urine  collection kit with enough time to complete it before your visit, please call our clinic and we will be happy to help you.       It was a pleasure meeting with you today.  Thank you for allowing me and my team the privilege of caring for you today.  YOU are the reason we are here, and I truly hope we provided you with the excellent service you deserve.  Please let us know if there is anything else we can do for you so that we can be sure you are leaving completely satisfied with your care experience.                   Follow-ups after your visit        Future tests that were ordered for you today     Open Future Orders        Priority Expected Expires Ordered    PSA tumor marker Routine  2019            Who to contact     Please call your clinic at [phone] to:    Ask questions about your health    Make or cancel appointments    Discuss your medicines    Learn about your test results    Speak to your doctor            Additional Information About Your Visit        MyChart Information     [employer] is an electronic gateway that provides easy, online access to your medical records. With [employer], you can request a clinic appointment, read your test results, renew a prescription or communicate with your care team.     To sign up for [employer] visit the website at [email or web address]   You will be asked to enter the access code listed below, as well as some personal information. Please follow the directions to create your username and password.     Your access code is: [email or web address]  Expires: 2019  8:49 AM     Your access code will  in 90 days. If you need help or a new code, please contact your [facility] or call [phone] for assistance.        Care EveryWhere ID     This is your Care EveryWhere ID. This could be used by other organizations to access your [place] medical records  [id number]        Your Vitals Were     Pulse Height BMI (Body Mass  "Index)             58 1.676 m (5' 6\") 26.08 kg/m2          Blood Pressure from Last 3 Encounters:   12/07/18 116/73   11/25/18 119/71    Weight from Last 3 Encounters:   12/07/18 73.3 kg (161 lb 9.6 oz)              We Performed the Following     Stone analysis        Primary Care Provider Fax #    Physician No Ref-Primary [phone]       No address on file        Equal Access to Services     RIGO WILCOX : Hadii aad ku hadasho Soomaali, waaxda luqadaha, qaybta kaalmada adeegyada, waxtrini heribertoin elsan adegerman razo laKazamelia . So [facility] [phone].    ATENCIÓN: Si habla español, tiene a hernandez disposición servicios gratuitos de asistencia lingüística. Llame al [phone].    We comply with applicable federal civil rights laws and [place] laws. We do not discriminate on the basis of race, color, national origin, age, disability, sex, sexual orientation, or gender identity.            Thank you!     Thank you for choosing [facility] UROLOGY AND [facility] FOR PROSTATE AND UROLOGIC CANCERS  for your care. Our goal is always to provide you with excellent care. Hearing back from our patients is one way we can continue to improve our services. Please take a few minutes to complete the written survey that you may receive in the mail after your visit with us. Thank you!             Your Updated Medication List - Protect others around you: Learn how to safely use, store and throw away your medicines at www.disposemymeds.org.          This list is accurate as of 12/7/18 12:40 PM.  Always use your most recent med list.                   Brand Name Dispense Instructions for use Diagnosis    ALEVE PO             "

## 2018-12-07 NOTE — LETTER
"12/7/2018       RE: Marshall Dean  [address] [address]     Dear Colleague,    Thank you for referring your patient, Marshall Dean, to the [facility] UROLOGY AND INST FOR PROSTATE AND UROLOGIC CANCERS at [facility]. Please see a copy of my visit note below.    It was my pleasure to see Marshall Dean a 58 year old year old male today. Patient was seen in consultation from for ureteral calculus.    HPI:   Patient with distal left ureteral calculus noted on CT scan on 11/25/2018.     Passed stone one week ago Wednesday.     Noted to have soft tissue changes in the bladder on imaging.     We discussed stone passage rates, stone risk assessment, bladder findings on imaging, cystoscopy.     No past medical history on file.    Past Surgical History:   Procedure Laterality Date     APPENDECTOMY       HERNIA REPAIR       TONSILLECTOMY         No family history on file.    Current Outpatient Prescriptions   Medication Sig Dispense Refill     Naproxen Sodium (ALEVE PO)        ALLERGIES: Review of patient's allergies indicates no known allergies.      GENERAL PHYSICAL EXAM:   Vitals: /73  Pulse 58  Ht 1.676 m (5' 6\")  Wt 73.3 kg (161 lb 9.6 oz)  BMI 26.08 kg/m2  Body mass index is 26.08 kg/(m^2).  Constitutional: healthy, alert and no distress  Head: Normocephalic  Neck: Neck supple  Cardiovascular: negative  Respiratory: negative  Gastrointestinal: Abdomen soft, non-tender  Musculoskeletal: extremities normal  Skin: no suspicious lesions or rashes  Neurologic: Gait normal  Psychiatric: affect normal/bright and mentation appears normal.     EXAM:   Left Flank: negative  Right Flank: negative  Inguinal Area: normal    RADIOLOGY: The following tests were reviewed: Need to complete the following Radiology exams prior to the office appointment:  LABS: The last test results for Needs to complete the necessary tests prior to appointment: were reviewed.     ASSESSMENT: " 57 y/o M with recently passed ureteral calculus and soft tissues findings in the bladder on CT scan     PLAN:   24 hr urine x2   Refer to nephrology stone clinic   Cystoscopy next available     I spent over 20 minutes with the patient.  Over half this time was spent on counseling for ureteral calculus and cystoscopy.    Again, thank you for allowing me to participate in the care of your patient.      Sincerely,    Janis Mott MD

## 2018-12-07 NOTE — NURSING NOTE
"Chief Complaint   Patient presents with     Consult For     Stones       Blood pressure 116/73, pulse 58, height 1.676 m (5' 6\"), weight 73.3 kg (161 lb 9.6 oz). Body mass index is 26.08 kg/(m^2).    There is no problem list on file for this patient.      No Known Allergies    Current Outpatient Prescriptions   Medication Sig Dispense Refill     Naproxen Sodium (ALEVE PO)          Social History   Substance Use Topics     Smoking status: Current Every Day Smoker     Smokeless tobacco: Never Used      Comment: Smoked 1ppd of cigarretes for 30 years, quit in 2013, and currently smokes e-cigs     Alcohol use Not on file       Maria Isabel Junior LPN  12/7/2018  11:54 AM       "

## 2018-12-11 LAB
APPEARANCE STONE: NORMAL
COMPN STONE: NORMAL
NUMBER STONE: 1
SIZE STONE: NORMAL MM
WT STONE: 18 MG

## 2018-12-17 ENCOUNTER — TRANSFERRED RECORDS (OUTPATIENT)
Dept: HEALTH INFORMATION MANAGEMENT | Facility: CLINIC | Age: 58
End: 2018-12-17

## (undated) RX ORDER — KETOROLAC TROMETHAMINE 30 MG/ML
INJECTION, SOLUTION INTRAMUSCULAR; INTRAVENOUS
Status: DISPENSED
Start: 2018-11-25

## (undated) RX ORDER — SODIUM CHLORIDE 9 MG/ML
INJECTION, SOLUTION INTRAVENOUS
Status: DISPENSED
Start: 2018-11-25

## (undated) RX ORDER — TAMSULOSIN HYDROCHLORIDE 0.4 MG/1
CAPSULE ORAL
Status: DISPENSED
Start: 2018-11-25

## (undated) RX ORDER — ONDANSETRON 4 MG/1
TABLET, ORALLY DISINTEGRATING ORAL
Status: DISPENSED
Start: 2018-11-25